# Patient Record
Sex: FEMALE | Race: WHITE | NOT HISPANIC OR LATINO | Employment: STUDENT | URBAN - METROPOLITAN AREA
[De-identification: names, ages, dates, MRNs, and addresses within clinical notes are randomized per-mention and may not be internally consistent; named-entity substitution may affect disease eponyms.]

---
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
## 2024-01-23 NOTE — PROGRESS NOTES
Patient: Chas Godinez    Procedure Information       Date/Time: 01/23/24 0930    Scheduled providers: Augie Painting DO    Procedure: COLONOSCOPY    Location:  [facility]            Relevant Problems   Anesthesia (within normal limits)      Cardiovascular   (+) Hyperlipemia      Endocrine (within normal limits)      /Renal (within normal limits)      Neuro/Psych   (+) Epilepsy without status epilepticus, not intractable (CMS/HCC) (Last 2 years ago)      Pulmonary   (+) Complex sleep apnea syndrome      GI/Hepatic (within normal limits)      Hematology (within normal limits)      Eyes, Ears, Nose, and Throat (within normal limits)       Clinical information reviewed:   Tobacco  Allergies  Meds   Med Hx  Surg Hx   Fam Hx  Soc Hx        NPO Detail:  NPO/Void Status  Carbohydrate Drink Given Prior to Surgery? : N  Date of Last Liquid: 01/23/24  Time of Last Liquid: 0400  Date of Last Solid: 01/21/24  Time of Last Solid: 1600  Last Intake Type: Clear fluids  Time of Last Void: 0904         Physical Exam    Airway  Mallampati: III  TM distance: >3 FB  Neck ROM: full     Cardiovascular - normal exam  Rhythm: regular     Dental - normal exam     Pulmonary - normal exam     Abdominal - normal exam         Anesthesia Plan    History of general anesthesia?: yes  History of complications of general anesthesia?: no    ASA 3     MAC     The patient is not a current smoker.    intravenous induction   Anesthetic plan and risks discussed with patient.   Subjective:     History of Present Illness:  Mónica Caballero is a 18 y.o. female who presents to the clinic today for Weight Gain (gain 30lbs in one yr      brought in by mom maribell) and breast issues (left breast pain )     History was provided by the patient and mother. Pt was last seen on 7/2/2018.  Mónica complains of excessive weight gain in the last year. According to growth chart, there has not been much change. Had thyroid checked about 2 years ago. GM just got diagnosed with hypothyroid. Pt has been trying to lose weight without success    Also reporting L breast pain with a knot off and on for several months. Seems to be getting worse. No discharge from nipple    Review of Systems   Constitutional: Positive for unexpected weight change. Negative for activity change, appetite change, fatigue and fever.   Endocrine: Negative.    Neurological: Negative.        Objective:     Physical Exam   Constitutional: She is oriented to person, place, and time. She appears well-developed and well-nourished.   Cardiovascular: Normal rate and regular rhythm.   Pulmonary/Chest: Effort normal and breath sounds normal.   Neurological: She is alert and oriented to person, place, and time.   Skin: Skin is warm and dry.       Assessment and Plan:     Weight gain  -     TSH; Future; Expected date: 08/29/2018  -     T4, FREE; Future; Expected date: 08/29/2018  -     Hemoglobin A1c; Future; Expected date: 08/29/2018  -     Comprehensive metabolic panel; Future; Expected date: 08/29/2018  -     Pregnancy, urine rapid  -     Urinalysis  -     Ambulatory referral to Nutrition Services    Breast pain, left  -     Pregnancy, urine rapid  -     US Breast Left Complete; Future; Expected date: 08/29/2018          Follow-up if symptoms worsen or fail to improve.